# Patient Record
(demographics unavailable — no encounter records)

---

## 2024-10-24 NOTE — ADDENDUM
[FreeTextEntry1] : I, Jorge Luis Major Jr, acted solely as a scribe for Dr. Og Dailey on this date 10/24/2024  All medical record entries made by the Scribe were at my, Dr. Og Dailey, direction and personally dictated by me on 10/24/2024 . I have reviewed the chart and agree that the record accurately reflects my personal performance of the history, physical exam, assessment and plan. I have also personally directed, reviewed, and agreed with the chart.

## 2024-10-24 NOTE — HISTORY OF PRESENT ILLNESS
[de-identified] : Pt is a 58 y/o male c/o right shoulder pain x 1 year.  The pain began when a ladder that he was on fell and he caught himself from falling with his right arm.He states that recently it has gotten progressively worse.  He cannot lay on his side at night.  He has difficulty lifting objects.  He states that recently he developed numbness and tingling in his hand.  He frequently drops objects.  He has difficulty picking up small items.  His right hand feels weak.  Today, Oct 24, 2024, the patient presents for follow-up and further management. The patient had relief after the steroid injection in the past. He states that his symptoms have worsened.t he continues to have pain, numbness and weakness  pertaining to his right hand. He states that recently, his symptoms have acutely worsen and he is unable to grab objects due to pain. He would like to schedule carpal tunnel release.

## 2024-10-24 NOTE — PHYSICAL EXAM
[de-identified] : Patient is WDWN, alert, and in no acute distress. Breathing is unlabored. He is grossly oriented to person, place, and time.   Right Wrist:  No tenderness, edema, or deformities. There is thenar atrophy. Full ROM with decreased sensation along median nerve distribution.  Tests/Signs: Tinel's sign is positive over carpal tunnel, Phalen's test is positive.   [de-identified] : Right shoulder Xrays 3v reveal a small calcific deposit adjacent to inferior aspect of greater tuberosity  Cervical spine Xrays 2v reveal C5-C6 and C6-C7 degenerative disc disease

## 2024-10-24 NOTE — DISCUSSION/SUMMARY
[de-identified] : The underlying pathophysiology was reviewed with the patient. XR films were reviewed with the patient. Discussed at length the nature of the patients condition. Their right wrist symptoms appear secondary to carpal tunnel syndrome. Treatment options were discussed including; surgical intervention.   The patient wishes to proceed with right carpal tunnel release at this time. The risks and benefits were reviewed with the patient. All of his questions were answered. He will meet with our surgical scheduler.  All questions answered, understanding verbalized. Patient in agreement with plan of care.

## 2024-10-24 NOTE — REASON FOR VISIT
[Follow-Up Visit] : a follow-up visit for [FreeTextEntry2] : Right shoulder & carpel tunnel syndrome

## 2024-11-18 NOTE — HISTORY OF PRESENT ILLNESS
[de-identified] : Pt is a 56 y/o male presents for 1st post op visit s/p right carpal tunnel release. DOS: 11/08/2024. Patient reports numbness in right hand wax and wanes.

## 2024-11-18 NOTE — PHYSICAL EXAM
[de-identified] : Patient is WDWN, alert, and in no acute distress. Breathing is unlabored. He is grossly oriented to person, place and time.  Right hand:  Inspection/ palpitation: No tenderness, edema, or deformities. No skin lesions or discoloration. Range of motion: full arc of motion in the small joints of the hand, no discomfort elicited. Strength: all intrinsic and extrinsic hand muscles 5/5 Stability: no joint instability on provocative testing Sensation: sensation intact to light touch [de-identified] : No imaging done today.

## 2024-11-18 NOTE — DISCUSSION/SUMMARY
[de-identified] : The underlying pathophysiology was reviewed with the patient. XR films were reviewed with the patient. Discussed at length the nature of the patient's condition.  Sutures removed and ace bandage applied. Instructed to keep hand dry until Friday. Advised to use vitamin E oil for scar. Advised to practice ROM exercises.  Patient should follow up in 6 weeks as needed.

## 2024-11-18 NOTE — ADDENDUM
[FreeTextEntry1] : This note was written by Sam Howard on 11/18/2024 actively solely JACKIE Rao M.D.     All medical record entries made by the Scribe were at my, JACKIE Rao M.D. direction and personally dictated by me on 11/18/2024. I have personally reviewed the chart and agree that the record reflects my personal performance of the history, physical exam, assessment, and plan.